# Patient Record
Sex: FEMALE | Race: WHITE | HISPANIC OR LATINO | Employment: FULL TIME | URBAN - METROPOLITAN AREA
[De-identification: names, ages, dates, MRNs, and addresses within clinical notes are randomized per-mention and may not be internally consistent; named-entity substitution may affect disease eponyms.]

---

## 2017-11-06 ENCOUNTER — HOSPITAL ENCOUNTER (OUTPATIENT)
Dept: RADIOLOGY | Facility: MEDICAL CENTER | Age: 42
End: 2017-11-06
Attending: FAMILY MEDICINE
Payer: COMMERCIAL

## 2017-11-06 ENCOUNTER — APPOINTMENT (OUTPATIENT)
Dept: DERMATOLOGY | Facility: IMAGING CENTER | Age: 42
End: 2017-11-06

## 2017-11-06 DIAGNOSIS — Z12.39 SCREENING BREAST EXAMINATION: ICD-10-CM

## 2017-11-06 PROCEDURE — G0202 SCR MAMMO BI INCL CAD: HCPCS

## 2018-02-26 ENCOUNTER — HOSPITAL ENCOUNTER (OUTPATIENT)
Dept: RADIOLOGY | Facility: MEDICAL CENTER | Age: 43
End: 2018-02-26
Attending: FAMILY MEDICINE
Payer: COMMERCIAL

## 2018-02-26 DIAGNOSIS — D17.1 BREAST LIPOMA: ICD-10-CM

## 2018-02-26 PROCEDURE — 76642 ULTRASOUND BREAST LIMITED: CPT | Mod: LT

## 2018-04-27 ENCOUNTER — OFFICE VISIT (OUTPATIENT)
Dept: URGENT CARE | Facility: PHYSICIAN GROUP | Age: 43
End: 2018-04-27
Payer: COMMERCIAL

## 2018-04-27 VITALS
BODY MASS INDEX: 26.5 KG/M2 | HEIGHT: 60 IN | SYSTOLIC BLOOD PRESSURE: 128 MMHG | HEART RATE: 72 BPM | DIASTOLIC BLOOD PRESSURE: 80 MMHG | RESPIRATION RATE: 14 BRPM | TEMPERATURE: 98 F | WEIGHT: 135 LBS | OXYGEN SATURATION: 98 %

## 2018-04-27 DIAGNOSIS — M62.838 MUSCLE SPASM: ICD-10-CM

## 2018-04-27 PROCEDURE — 99204 OFFICE O/P NEW MOD 45 MIN: CPT | Performed by: FAMILY MEDICINE

## 2018-04-27 RX ORDER — SENNOSIDES 8.6 MG
650 CAPSULE ORAL EVERY 6 HOURS PRN
COMMUNITY
End: 2023-04-04

## 2018-04-27 RX ORDER — CYCLOBENZAPRINE HCL 10 MG
10 TABLET ORAL
Qty: 15 TAB | Refills: 0 | Status: SHIPPED | OUTPATIENT
Start: 2018-04-27 | End: 2022-07-07

## 2018-04-27 RX ORDER — MELOXICAM 15 MG/1
15 TABLET ORAL DAILY
Qty: 30 TAB | Refills: 0 | Status: SHIPPED | OUTPATIENT
Start: 2018-04-27 | End: 2022-07-07

## 2018-04-27 ASSESSMENT — ENCOUNTER SYMPTOMS
BOWEL INCONTINENCE: 0
DIZZINESS: 0
VOMITING: 0
LEG PAIN: 0
CHILLS: 0
WEAKNESS: 0
SORE THROAT: 0
EYE PAIN: 0
PERIANAL NUMBNESS: 0
SHORTNESS OF BREATH: 0
PARESIS: 0
MYALGIAS: 0
BACK PAIN: 1
NAUSEA: 0
NUMBNESS: 0
TINGLING: 0
FEVER: 0

## 2018-04-27 ASSESSMENT — PAIN SCALES - GENERAL: PAINLEVEL: 7=MODERATE-SEVERE PAIN

## 2018-04-27 NOTE — LETTER
April 27, 2018         Patient: Tracey Pruitt   YOB: 1975   Date of Visit: 4/27/2018           To Whom it May Concern:    Tracey Pruitt was seen in my clinic on 4/27/2018. She may return to work on 4/30/2018..    If you have any questions or concerns, please don't hesitate to call.        Sincerely,           Lars Puri M.D.  Electronically Signed

## 2019-07-25 ENCOUNTER — HOSPITAL ENCOUNTER (OUTPATIENT)
Dept: RADIOLOGY | Facility: MEDICAL CENTER | Age: 44
End: 2019-07-25
Attending: FAMILY MEDICINE
Payer: COMMERCIAL

## 2019-07-25 DIAGNOSIS — Z12.31 VISIT FOR SCREENING MAMMOGRAM: ICD-10-CM

## 2019-07-25 PROCEDURE — 77063 BREAST TOMOSYNTHESIS BI: CPT

## 2021-02-02 ENCOUNTER — HOSPITAL ENCOUNTER (OUTPATIENT)
Dept: LAB | Facility: MEDICAL CENTER | Age: 46
End: 2021-02-02
Payer: COMMERCIAL

## 2021-02-02 LAB
COVID ORDER STATUS COVID19: NORMAL
SARS-COV-2 RNA RESP QL NAA+PROBE: NOTDETECTED
SPECIMEN SOURCE: NORMAL

## 2022-06-14 ENCOUNTER — TELEPHONE (OUTPATIENT)
Dept: SCHEDULING | Facility: IMAGING CENTER | Age: 47
End: 2022-06-14
Payer: COMMERCIAL

## 2022-06-30 ENCOUNTER — HOSPITAL ENCOUNTER (OUTPATIENT)
Dept: RADIOLOGY | Facility: MEDICAL CENTER | Age: 47
End: 2022-06-30
Payer: COMMERCIAL

## 2022-07-06 ENCOUNTER — HOSPITAL ENCOUNTER (OUTPATIENT)
Dept: RADIOLOGY | Facility: MEDICAL CENTER | Age: 47
End: 2022-07-06
Attending: SURGERY
Payer: COMMERCIAL

## 2022-07-06 DIAGNOSIS — R92.8 ABNORMAL FINDINGS ON DIAGNOSTIC IMAGING OF BREAST: ICD-10-CM

## 2022-07-06 PROCEDURE — 19283 PERQ DEV BREAST 1ST STRTCTC: CPT

## 2022-07-07 ENCOUNTER — PRE-ADMISSION TESTING (OUTPATIENT)
Dept: ADMISSIONS | Facility: MEDICAL CENTER | Age: 47
End: 2022-07-07
Attending: SURGERY
Payer: COMMERCIAL

## 2022-07-07 DIAGNOSIS — Z01.812 PRE-OPERATIVE LABORATORY EXAMINATION: ICD-10-CM

## 2022-07-07 LAB
ALBUMIN SERPL BCP-MCNC: 4.4 G/DL (ref 3.2–4.9)
ALBUMIN/GLOB SERPL: 1.4 G/DL
ALP SERPL-CCNC: 162 U/L (ref 30–99)
ALT SERPL-CCNC: 12 U/L (ref 2–50)
ANION GAP SERPL CALC-SCNC: 13 MMOL/L (ref 7–16)
AST SERPL-CCNC: 11 U/L (ref 12–45)
BASOPHILS # BLD AUTO: 0.3 % (ref 0–1.8)
BASOPHILS # BLD: 0.02 K/UL (ref 0–0.12)
BILIRUB SERPL-MCNC: 0.2 MG/DL (ref 0.1–1.5)
BUN SERPL-MCNC: 12 MG/DL (ref 8–22)
CALCIUM SERPL-MCNC: 9.6 MG/DL (ref 8.5–10.5)
CHLORIDE SERPL-SCNC: 105 MMOL/L (ref 96–112)
CO2 SERPL-SCNC: 24 MMOL/L (ref 20–33)
CREAT SERPL-MCNC: 0.75 MG/DL (ref 0.5–1.4)
EOSINOPHIL # BLD AUTO: 0.13 K/UL (ref 0–0.51)
EOSINOPHIL NFR BLD: 2.1 % (ref 0–6.9)
ERYTHROCYTE [DISTWIDTH] IN BLOOD BY AUTOMATED COUNT: 40.6 FL (ref 35.9–50)
GFR SERPLBLD CREATININE-BSD FMLA CKD-EPI: 97 ML/MIN/1.73 M 2
GLOBULIN SER CALC-MCNC: 3.1 G/DL (ref 1.9–3.5)
GLUCOSE SERPL-MCNC: 85 MG/DL (ref 65–99)
HCT VFR BLD AUTO: 43.3 % (ref 37–47)
HGB BLD-MCNC: 14.6 G/DL (ref 12–16)
IMM GRANULOCYTES # BLD AUTO: 0.02 K/UL (ref 0–0.11)
IMM GRANULOCYTES NFR BLD AUTO: 0.3 % (ref 0–0.9)
INR PPP: 0.99 (ref 0.87–1.13)
LYMPHOCYTES # BLD AUTO: 2.2 K/UL (ref 1–4.8)
LYMPHOCYTES NFR BLD: 35.4 % (ref 22–41)
MCH RBC QN AUTO: 30.6 PG (ref 27–33)
MCHC RBC AUTO-ENTMCNC: 33.7 G/DL (ref 33.6–35)
MCV RBC AUTO: 90.8 FL (ref 81.4–97.8)
MONOCYTES # BLD AUTO: 0.39 K/UL (ref 0–0.85)
MONOCYTES NFR BLD AUTO: 6.3 % (ref 0–13.4)
NEUTROPHILS # BLD AUTO: 3.45 K/UL (ref 2–7.15)
NEUTROPHILS NFR BLD: 55.6 % (ref 44–72)
NRBC # BLD AUTO: 0 K/UL
NRBC BLD-RTO: 0 /100 WBC
PLATELET # BLD AUTO: 321 K/UL (ref 164–446)
PMV BLD AUTO: 10.6 FL (ref 9–12.9)
POTASSIUM SERPL-SCNC: 3.7 MMOL/L (ref 3.6–5.5)
PROT SERPL-MCNC: 7.5 G/DL (ref 6–8.2)
PROTHROMBIN TIME: 13 SEC (ref 12–14.6)
RBC # BLD AUTO: 4.77 M/UL (ref 4.2–5.4)
SODIUM SERPL-SCNC: 142 MMOL/L (ref 135–145)
WBC # BLD AUTO: 6.2 K/UL (ref 4.8–10.8)

## 2022-07-07 PROCEDURE — 80053 COMPREHEN METABOLIC PANEL: CPT

## 2022-07-07 PROCEDURE — 85610 PROTHROMBIN TIME: CPT

## 2022-07-07 PROCEDURE — 36415 COLL VENOUS BLD VENIPUNCTURE: CPT

## 2022-07-07 PROCEDURE — 85025 COMPLETE CBC W/AUTO DIFF WBC: CPT

## 2022-07-07 RX ORDER — M-VIT,TX,IRON,MINS/CALC/FOLIC 27MG-0.4MG
1 TABLET ORAL DAILY
COMMUNITY
End: 2023-04-04

## 2022-07-14 ENCOUNTER — ANESTHESIA (OUTPATIENT)
Dept: SURGERY | Facility: MEDICAL CENTER | Age: 47
End: 2022-07-14
Payer: COMMERCIAL

## 2022-07-14 ENCOUNTER — APPOINTMENT (OUTPATIENT)
Dept: RADIOLOGY | Facility: MEDICAL CENTER | Age: 47
End: 2022-07-14
Attending: SURGERY
Payer: COMMERCIAL

## 2022-07-14 ENCOUNTER — HOSPITAL ENCOUNTER (OUTPATIENT)
Facility: MEDICAL CENTER | Age: 47
End: 2022-07-14
Attending: SURGERY | Admitting: SURGERY
Payer: COMMERCIAL

## 2022-07-14 ENCOUNTER — ANESTHESIA EVENT (OUTPATIENT)
Dept: SURGERY | Facility: MEDICAL CENTER | Age: 47
End: 2022-07-14
Payer: COMMERCIAL

## 2022-07-14 VITALS
DIASTOLIC BLOOD PRESSURE: 68 MMHG | BODY MASS INDEX: 28.57 KG/M2 | HEART RATE: 65 BPM | RESPIRATION RATE: 15 BRPM | HEIGHT: 60 IN | OXYGEN SATURATION: 96 % | WEIGHT: 145.5 LBS | SYSTOLIC BLOOD PRESSURE: 134 MMHG | TEMPERATURE: 98.5 F

## 2022-07-14 DIAGNOSIS — R92.8 ABNORMAL MAMMOGRAM: ICD-10-CM

## 2022-07-14 DIAGNOSIS — G89.18 POSTOPERATIVE PAIN: ICD-10-CM

## 2022-07-14 DIAGNOSIS — R92.8 ABNORMAL FINDINGS ON DIAGNOSTIC IMAGING OF BREAST: ICD-10-CM

## 2022-07-14 PROBLEM — N60.91 ATYPICAL LOBULAR HYPERPLASIA (ALH) OF RIGHT BREAST: Status: ACTIVE | Noted: 2022-07-14

## 2022-07-14 LAB
HCG UR QL: NEGATIVE
PATHOLOGY CONSULT NOTE: NORMAL

## 2022-07-14 PROCEDURE — 19281 PERQ DEVICE BREAST 1ST IMAG: CPT | Mod: RT

## 2022-07-14 PROCEDURE — 160039 HCHG SURGERY MINUTES - EA ADDL 1 MIN LEVEL 3: Performed by: SURGERY

## 2022-07-14 PROCEDURE — 76098 X-RAY EXAM SURGICAL SPECIMEN: CPT | Mod: RT

## 2022-07-14 PROCEDURE — 160048 HCHG OR STATISTICAL LEVEL 1-5: Performed by: SURGERY

## 2022-07-14 PROCEDURE — 88307 TISSUE EXAM BY PATHOLOGIST: CPT

## 2022-07-14 PROCEDURE — 160002 HCHG RECOVERY MINUTES (STAT): Performed by: SURGERY

## 2022-07-14 PROCEDURE — 81025 URINE PREGNANCY TEST: CPT

## 2022-07-14 PROCEDURE — 700111 HCHG RX REV CODE 636 W/ 250 OVERRIDE (IP): Performed by: ANESTHESIOLOGY

## 2022-07-14 PROCEDURE — 160047 HCHG PACU  - EA ADDL 30 MINS PHASE II: Performed by: SURGERY

## 2022-07-14 PROCEDURE — 160009 HCHG ANES TIME/MIN: Performed by: SURGERY

## 2022-07-14 PROCEDURE — 700101 HCHG RX REV CODE 250: Performed by: ANESTHESIOLOGY

## 2022-07-14 PROCEDURE — 160025 RECOVERY II MINUTES (STATS): Performed by: SURGERY

## 2022-07-14 PROCEDURE — 160035 HCHG PACU - 1ST 60 MINS PHASE I: Performed by: SURGERY

## 2022-07-14 PROCEDURE — 160028 HCHG SURGERY MINUTES - 1ST 30 MINS LEVEL 3: Performed by: SURGERY

## 2022-07-14 PROCEDURE — 00400 ANES INTEGUMENTARY SYS NOS: CPT | Performed by: ANESTHESIOLOGY

## 2022-07-14 PROCEDURE — 700105 HCHG RX REV CODE 258: Performed by: SURGERY

## 2022-07-14 PROCEDURE — 160046 HCHG PACU - 1ST 60 MINS PHASE II: Performed by: SURGERY

## 2022-07-14 RX ORDER — HYDROMORPHONE HYDROCHLORIDE 1 MG/ML
0.2 INJECTION, SOLUTION INTRAMUSCULAR; INTRAVENOUS; SUBCUTANEOUS
Status: DISCONTINUED | OUTPATIENT
Start: 2022-07-14 | End: 2022-07-14 | Stop reason: HOSPADM

## 2022-07-14 RX ORDER — SODIUM CHLORIDE, SODIUM LACTATE, POTASSIUM CHLORIDE, CALCIUM CHLORIDE 600; 310; 30; 20 MG/100ML; MG/100ML; MG/100ML; MG/100ML
INJECTION, SOLUTION INTRAVENOUS CONTINUOUS
Status: DISCONTINUED | OUTPATIENT
Start: 2022-07-14 | End: 2022-07-14 | Stop reason: HOSPADM

## 2022-07-14 RX ORDER — HYDRALAZINE HYDROCHLORIDE 20 MG/ML
5 INJECTION INTRAMUSCULAR; INTRAVENOUS
Status: DISCONTINUED | OUTPATIENT
Start: 2022-07-14 | End: 2022-07-14 | Stop reason: HOSPADM

## 2022-07-14 RX ORDER — HALOPERIDOL 5 MG/ML
1 INJECTION INTRAMUSCULAR
Status: DISCONTINUED | OUTPATIENT
Start: 2022-07-14 | End: 2022-07-14 | Stop reason: HOSPADM

## 2022-07-14 RX ORDER — LABETALOL HYDROCHLORIDE 5 MG/ML
5 INJECTION, SOLUTION INTRAVENOUS
Status: DISCONTINUED | OUTPATIENT
Start: 2022-07-14 | End: 2022-07-14 | Stop reason: HOSPADM

## 2022-07-14 RX ORDER — DEXAMETHASONE SODIUM PHOSPHATE 4 MG/ML
INJECTION, SOLUTION INTRA-ARTICULAR; INTRALESIONAL; INTRAMUSCULAR; INTRAVENOUS; SOFT TISSUE PRN
Status: DISCONTINUED | OUTPATIENT
Start: 2022-07-14 | End: 2022-07-14 | Stop reason: SURG

## 2022-07-14 RX ORDER — MIDAZOLAM HYDROCHLORIDE 1 MG/ML
1 INJECTION INTRAMUSCULAR; INTRAVENOUS
Status: DISCONTINUED | OUTPATIENT
Start: 2022-07-14 | End: 2022-07-14 | Stop reason: HOSPADM

## 2022-07-14 RX ORDER — ONDANSETRON 2 MG/ML
4 INJECTION INTRAMUSCULAR; INTRAVENOUS
Status: DISCONTINUED | OUTPATIENT
Start: 2022-07-14 | End: 2022-07-14 | Stop reason: HOSPADM

## 2022-07-14 RX ORDER — PHENYLEPHRINE HYDROCHLORIDE 10 MG/ML
INJECTION, SOLUTION INTRAMUSCULAR; INTRAVENOUS; SUBCUTANEOUS PRN
Status: DISCONTINUED | OUTPATIENT
Start: 2022-07-14 | End: 2022-07-14 | Stop reason: SURG

## 2022-07-14 RX ORDER — BUPIVACAINE HYDROCHLORIDE AND EPINEPHRINE 5; 5 MG/ML; UG/ML
INJECTION, SOLUTION EPIDURAL; INTRACAUDAL; PERINEURAL
Status: DISCONTINUED
Start: 2022-07-14 | End: 2022-07-14 | Stop reason: HOSPADM

## 2022-07-14 RX ORDER — OXYCODONE HCL 5 MG/5 ML
10 SOLUTION, ORAL ORAL
Status: DISCONTINUED | OUTPATIENT
Start: 2022-07-14 | End: 2022-07-14 | Stop reason: HOSPADM

## 2022-07-14 RX ORDER — LIDOCAINE HYDROCHLORIDE 20 MG/ML
INJECTION, SOLUTION EPIDURAL; INFILTRATION; INTRACAUDAL; PERINEURAL
Status: COMPLETED
Start: 2022-07-14 | End: 2022-07-14

## 2022-07-14 RX ORDER — MEPERIDINE HYDROCHLORIDE 25 MG/ML
12.5 INJECTION INTRAMUSCULAR; INTRAVENOUS; SUBCUTANEOUS
Status: DISCONTINUED | OUTPATIENT
Start: 2022-07-14 | End: 2022-07-14 | Stop reason: HOSPADM

## 2022-07-14 RX ORDER — HYDROMORPHONE HYDROCHLORIDE 1 MG/ML
0.1 INJECTION, SOLUTION INTRAMUSCULAR; INTRAVENOUS; SUBCUTANEOUS
Status: DISCONTINUED | OUTPATIENT
Start: 2022-07-14 | End: 2022-07-14 | Stop reason: HOSPADM

## 2022-07-14 RX ORDER — TRAMADOL HYDROCHLORIDE 50 MG/1
50 TABLET ORAL EVERY 4 HOURS PRN
Qty: 12 TABLET | Refills: 0 | Status: SHIPPED | OUTPATIENT
Start: 2022-07-14 | End: 2022-07-17

## 2022-07-14 RX ORDER — ONDANSETRON 2 MG/ML
INJECTION INTRAMUSCULAR; INTRAVENOUS PRN
Status: DISCONTINUED | OUTPATIENT
Start: 2022-07-14 | End: 2022-07-14 | Stop reason: SURG

## 2022-07-14 RX ORDER — HYDROMORPHONE HYDROCHLORIDE 1 MG/ML
0.4 INJECTION, SOLUTION INTRAMUSCULAR; INTRAVENOUS; SUBCUTANEOUS
Status: DISCONTINUED | OUTPATIENT
Start: 2022-07-14 | End: 2022-07-14 | Stop reason: HOSPADM

## 2022-07-14 RX ORDER — METOPROLOL TARTRATE 1 MG/ML
1 INJECTION, SOLUTION INTRAVENOUS
Status: DISCONTINUED | OUTPATIENT
Start: 2022-07-14 | End: 2022-07-14 | Stop reason: HOSPADM

## 2022-07-14 RX ORDER — ALBUTEROL SULFATE 2.5 MG/3ML
2.5 SOLUTION RESPIRATORY (INHALATION)
Status: DISCONTINUED | OUTPATIENT
Start: 2022-07-14 | End: 2022-07-14 | Stop reason: HOSPADM

## 2022-07-14 RX ORDER — CEFAZOLIN SODIUM 1 G/3ML
INJECTION, POWDER, FOR SOLUTION INTRAMUSCULAR; INTRAVENOUS PRN
Status: DISCONTINUED | OUTPATIENT
Start: 2022-07-14 | End: 2022-07-14 | Stop reason: SURG

## 2022-07-14 RX ORDER — LIDOCAINE HYDROCHLORIDE 20 MG/ML
INJECTION, SOLUTION EPIDURAL; INFILTRATION; INTRACAUDAL; PERINEURAL PRN
Status: DISCONTINUED | OUTPATIENT
Start: 2022-07-14 | End: 2022-07-14 | Stop reason: HOSPADM

## 2022-07-14 RX ORDER — OXYCODONE HCL 5 MG/5 ML
5 SOLUTION, ORAL ORAL
Status: DISCONTINUED | OUTPATIENT
Start: 2022-07-14 | End: 2022-07-14 | Stop reason: HOSPADM

## 2022-07-14 RX ORDER — DIPHENHYDRAMINE HYDROCHLORIDE 50 MG/ML
12.5 INJECTION INTRAMUSCULAR; INTRAVENOUS
Status: DISCONTINUED | OUTPATIENT
Start: 2022-07-14 | End: 2022-07-14 | Stop reason: HOSPADM

## 2022-07-14 RX ADMIN — MIDAZOLAM 2 MG: 1 INJECTION INTRAMUSCULAR; INTRAVENOUS at 12:15

## 2022-07-14 RX ADMIN — FENTANYL CITRATE 100 MCG: 50 INJECTION, SOLUTION INTRAMUSCULAR; INTRAVENOUS at 12:15

## 2022-07-14 RX ADMIN — DEXAMETHASONE SODIUM PHOSPHATE 8 MG: 4 INJECTION, SOLUTION INTRA-ARTICULAR; INTRALESIONAL; INTRAMUSCULAR; INTRAVENOUS; SOFT TISSUE at 12:15

## 2022-07-14 RX ADMIN — SODIUM CHLORIDE, POTASSIUM CHLORIDE, SODIUM LACTATE AND CALCIUM CHLORIDE: 600; 310; 30; 20 INJECTION, SOLUTION INTRAVENOUS at 06:55

## 2022-07-14 RX ADMIN — ONDANSETRON 4 MG: 2 INJECTION INTRAMUSCULAR; INTRAVENOUS at 12:15

## 2022-07-14 RX ADMIN — CEFAZOLIN 2 G: 330 INJECTION, POWDER, FOR SOLUTION INTRAMUSCULAR; INTRAVENOUS at 12:15

## 2022-07-14 RX ADMIN — LIDOCAINE HYDROCHLORIDE 100 MG: 20 INJECTION, SOLUTION EPIDURAL; INFILTRATION; INTRACAUDAL at 12:15

## 2022-07-14 RX ADMIN — PROPOFOL 150 MG: 10 INJECTION, EMULSION INTRAVENOUS at 12:15

## 2022-07-14 RX ADMIN — PHENYLEPHRINE HYDROCHLORIDE 100 MCG: 10 INJECTION INTRAVENOUS at 12:24

## 2022-07-14 ASSESSMENT — PAIN SCALES - GENERAL: PAIN_LEVEL: 0

## 2022-07-14 ASSESSMENT — PAIN DESCRIPTION - PAIN TYPE
TYPE: SURGICAL PAIN

## 2022-07-14 ASSESSMENT — FIBROSIS 4 INDEX: FIB4 SCORE: 0.48

## 2022-07-14 NOTE — ANESTHESIA PROCEDURE NOTES
Airway    Date/Time: 7/14/2022 12:16 PM  Performed by: Cecil Hogan M.D.  Authorized by: Cecil Hogan M.D.     Location:  OR  Urgency:  Elective  Indications for Airway Management:  Anesthesia      Spontaneous Ventilation: absent    Sedation Level:  Deep  Preoxygenated: Yes    Final Airway Type:  Supraglottic airway  Final Supraglottic Airway:  Standard LMA    SGA Size:  3  Number of Attempts at Approach:  1

## 2022-07-14 NOTE — PROGRESS NOTES
1309- Patient arrived in PACU and was connected to monitors. Vital signs are stable, patient is on 10L O2 via mask. Report received from anesthesiologist and OR RN. Oral airway in place.    1310- Oral airway dc'd.     1320- Patient is starting to wake up and reports no pain.     1345- Report given to MARIBELL Diaz.

## 2022-07-14 NOTE — OR NURSING
1345 - Pt now on room air, sleeping, VSS, no signs pain or nausea, dressings to chest are clean/dry/intact and no signs of swelling or bleeding noted.     1436 - Discharge instructions gone over with patient and daughter.  Pt having juice, got dressed with minimal assistance.     1445 - Pt stable to discharge.  Instructions given, patient and daughter verbalize understanding.  IV And armbands removed.  Taken via wheelchair to car.  Pt has all belongings with them.

## 2022-07-14 NOTE — ANESTHESIA POSTPROCEDURE EVALUATION
Patient: Tracey Pruitt    Procedure Summary     Date: 07/14/22 Room / Location: [facility] ROOM 24 / SURGERY SAME DAY [facility]    Anesthesia Start: 1211 Anesthesia Stop: 1314    Procedure: WIRE DIRECTED RIGHT BIOPSY, BREAST (Breast) Diagnosis: (ABNORMAL FINDINGS ON DIAGNOSTIC IMAGING OF BREAST)    Surgeons: Alfredo Mcelroy M.D. Responsible Provider: Cecil Hogan M.D.    Anesthesia Type: general ASA Status: 2          Final Anesthesia Type: general  Last vitals  BP   Blood Pressure: 110/72    Temp   36.9 °C (98.5 °F)    Pulse   (!) 109   Resp   15    SpO2   98 %      Anesthesia Post Evaluation    Pain score: 0                No complications documented.     Nurse Pain Score: 0 (NPRS)

## 2022-07-14 NOTE — ANESTHESIA TIME REPORT
Anesthesia Start and Stop Event Times     Date Time Event    7/14/2022 1211 Anesthesia Start     1314 Anesthesia Stop        Responsible Staff  07/14/22    Name Role Begin End    Cecil Hogan M.D. Anesth 1211 1314        Overtime Reason:  no overtime (within assigned shift)    Comments:

## 2022-07-15 NOTE — OP REPORT
DATE OF SERVICE:  07/14/2022     SURGEON:  Alfredo Mcelroy MD     ASSISTANT:  Susan Marcos ASC     ANESTHESIOLOGIST:  Cecil Hogan MD     TYPE OF ANESTHETIC:  General anesthetic using laryngeal mask airway plus local   0.5% Marcaine with epinephrine.     PREOPERATIVE DIAGNOSIS:  Clustered calcifications in the right upper mid   breast region with a core biopsy revealing multiple foci of atypical lobular   hyperplasia.     POSTOPERATIVE DIAGNOSIS:  Clustered calcifications in the right upper mid   breast region with a core biopsy revealing multiple foci of atypical lobular   hyperplasia.     PROCEDURE:  Wire localized and RAMBO-directed right breast lumpectomy.     FINDINGS:  The patient is a 47-year-old female who had a mammogram back in   05/2021, which revealed a small cluster of microcalcifications in the right   mid breast.  A core biopsy was performed and revealed adenosis and well as   several small foci of atypical lobular hyperplasia.  She had another mammogram   in 06/2022 and the clip from the previous biopsy was noted along with   additional indeterminate calcifications in this area.  It was felt by   radiology that these needed to be removed as they were somewhat suspicious.    Therefore, a RAMBO-directed lumpectomy was recommended.  The patient had a RAMBO   placed preoperatively; however, the clip could not be identified by the   detector in radiology and therefore on the day of surgery, the patient also   had a wire placed in the area of the previous biopsy into the RAMBO clip.  A   lumpectomy around this area was performed and the specimen radiograph revealed   both the previous biopsy clip and the RAMBO clip to be in the center of the   specimen along with an area of clustered microcalcifications.  There were no   apparent complications.     FINDING AND PROCEDURE:  The patient was brought to the operating room and   placed on the table in supine position.  General anesthetic was then provided    by the anesthesiologist, Dr. Hogan.  The right breast was then prepped and   draped in the usual sterile fashion, being careful not to dislodge the wire   that had been placed by radiology.  This wire did entry at the 12 o'clock   position of the right breast.  A timeout was called.  The correct patient,   correct diagnosis, correct surgery, and correct location of the surgery were   discussed and agreed upon.  She received preoperative IV antibiotics.  A   curvilinear incision was then made long term between where the wire into the   superior breast and the areolar edge.  This incision was made with #15 blade   and all bleeding was controlled with electrocautery.  Dissection was then   carried down deeper into the breast tissue.  The wire was then found to be   coursing to the breast tissue and was brought into the operative field.  At   this point, the RAMBO detector could locate the reflector clip and it also was   used to help guide the dissection.  Lumpectomy was performed around the wire   trying to obtain a 2-3 cm margin around this area.  This was carried down to   the pectoralis major muscle and then the entire specimen with the wire in the   center of it was removed en bloc.  A specimen radiograph was obtained, which   revealed the clip from the first biopsy, the RAMBO reflector as well as a   suspicious clustered calcifications next to the clips were then identified.    These were then sent to pathology for further characterization.  The wound was   irrigated with approximately 100 mL of warm normal saline.  The deeper breast   tissue was very dense, but was brought back together with 3-0 Vicryl sutures.    Dermis of the incision was closed using running 3-0 Vicryl suture and the   skin was closed using running 4-0 Monocryl suture in subcuticular fashion.    Steri-Strips and sterile dressings were applied and Ace wrap was applied.  The   patient tolerated the procedure well with no complication.  Final  sponge and   needle count were correct.     An assistant was utilized for the lumpectomy and was necessary for completion   of the operation.  The assistant assisted with the retraction of the breast   flaps, so that the lumpectomy could be performed.  The assistant also assisted   with the multilayer closure.        ______________________________  MD JUSTIN DYER/GOMEZ/GLADYS    DD:  07/14/2022 20:45  DT:  07/14/2022 22:09    Job#:  [id number]    CC:DO PRASHANTH Leavitt, COA  Bonnie Swartz DO

## 2022-09-14 ENCOUNTER — APPOINTMENT (OUTPATIENT)
Dept: HEMATOLOGY ONCOLOGY | Facility: MEDICAL CENTER | Age: 47
End: 2022-09-14
Payer: COMMERCIAL

## 2022-10-31 ENCOUNTER — APPOINTMENT (OUTPATIENT)
Dept: HEMATOLOGY ONCOLOGY | Facility: MEDICAL CENTER | Age: 47
End: 2022-10-31
Payer: COMMERCIAL

## 2022-11-14 ENCOUNTER — HOSPITAL ENCOUNTER (OUTPATIENT)
Dept: HEMATOLOGY ONCOLOGY | Facility: MEDICAL CENTER | Age: 47
End: 2022-11-14
Attending: INTERNAL MEDICINE
Payer: COMMERCIAL

## 2022-11-14 VITALS
HEART RATE: 65 BPM | DIASTOLIC BLOOD PRESSURE: 72 MMHG | RESPIRATION RATE: 18 BRPM | OXYGEN SATURATION: 97 % | WEIGHT: 146.83 LBS | TEMPERATURE: 97.4 F | SYSTOLIC BLOOD PRESSURE: 125 MMHG | HEIGHT: 60 IN | BODY MASS INDEX: 28.83 KG/M2

## 2022-11-14 DIAGNOSIS — N60.91 ATYPICAL LOBULAR HYPERPLASIA (ALH) OF RIGHT BREAST: ICD-10-CM

## 2022-11-14 PROCEDURE — 99204 OFFICE O/P NEW MOD 45 MIN: CPT | Performed by: INTERNAL MEDICINE

## 2022-11-14 PROCEDURE — 99212 OFFICE O/P EST SF 10 MIN: CPT | Performed by: INTERNAL MEDICINE

## 2022-11-14 ASSESSMENT — PAIN SCALES - GENERAL: PAINLEVEL: NO PAIN

## 2022-11-14 ASSESSMENT — ENCOUNTER SYMPTOMS
GASTROINTESTINAL NEGATIVE: 1
NEUROLOGICAL NEGATIVE: 1
CONSTITUTIONAL NEGATIVE: 1
PSYCHIATRIC NEGATIVE: 1
RESPIRATORY NEGATIVE: 1
MUSCULOSKELETAL NEGATIVE: 1
CARDIOVASCULAR NEGATIVE: 1
EYES NEGATIVE: 1

## 2022-11-14 ASSESSMENT — FIBROSIS 4 INDEX: FIB4 SCORE: 0.48

## 2022-11-14 NOTE — LETTER
[facility]   [address] [place] [address],   [address]  [place] [address]  Phone: [phone]  Fax: [phone]              Tracey Pruitt  1975    Encounter Date: 2022    Hoang Sow M.D.          PROGRESS NOTE:  Consult:  Hematology/Oncology      Referring Physician: Alfredo Mcelroy MD  Primary Care:  Pcp Unknown    Diagnosis: LCIS of the right breast    Chief Complaint: LCIS of the right breast    History of Presenting Illness:  Tracey Pruitt is a 47 y.o. perimenopausal  female who had an abnormal mammogram at an outside facility in .  She saw Dr. Mcelroy who did a upper outer quadrant right breast lumpectomy on 2022 which demonstrated lobular carcinoma in situ, usual type with atypical lobular hyperplasia.  Microcalcifications were appreciated.  There was no DCIS.  She was placed on observation and has another mammogram scheduled for January.  She comes in today to discuss chemoprophylaxis.  She is generally healthy and gets her period every several months.  She has occasional hot flashes and night sweats.  She does note a subcutaneous lump in her right axilla that opens and expresses yellow discharge at times.  It is somewhat painful when it gets swollen.  This is happened a couple of times already in the past few months.  She is otherwise healthy and working full-time.      Past Medical History:   Diagnosis Date   • Atypical lobular hyperplasia (ALH) of right breast 2022   • Pain     foot pain       Past Surgical History:   Procedure Laterality Date   • BREAST BIOPSY  2022    Procedure: WIRE DIRECTED RIGHT BIOPSY, BREAST;  Surgeon: Alfredo Mcelroy M.D.;  Location: SURGERY SAME DAY [facility];  Service: General   • OTHER ABDOMINAL SURGERY             Social History     Tobacco Use   • Smoking status: Former     Packs/day: 0.50     Years: 20.00     Pack years: 10.00     Types: Cigarettes     Quit date:      Years since quittin.8   • Smokeless tobacco:  Never   • Tobacco comments:     4 cig. per day   Vaping Use   • Vaping Use: Never used   Substance Use Topics   • Alcohol use: No   • Drug use: No        Family History   Problem Relation Age of Onset   • Stroke Neg Hx    • Heart Disease Neg Hx        Allergies as of 11/14/2022   • (No Known Allergies)         Current Outpatient Medications:   •  therapeutic multivitamin-minerals (THERAGRAN-M) Tab, Take 1 Tablet by mouth every day., Disp: , Rfl:   •  acetaminophen (TYLENOL) 650 MG CR tablet, Take 1 Tablet by mouth every 6 hours as needed., Disp: , Rfl:     Review of Systems:  Review of Systems   Constitutional: Negative.    HENT: Negative.     Eyes: Negative.    Respiratory: Negative.     Cardiovascular: Negative.    Gastrointestinal: Negative.    Genitourinary: Negative.    Musculoskeletal: Negative.    Skin: Negative.    Neurological: Negative.    Endo/Heme/Allergies: Negative.    Psychiatric/Behavioral: Negative.          Physical Exam:  Vitals:    11/14/22 0958   BP: 125/72   Pulse: 65   Resp: 18   Temp: 36.3 °C (97.4 °F)   TempSrc: Temporal   SpO2: 97%   Weight: 66.6 kg (146 lb 13.2 oz)   Height: 1.524 m (5')       DESC; KARNOFSKY SCALE WITH ECOG EQUIVALENT: 100, Fully active, able to carry on all pre-disease performed without restriction (ECOG equivalent 0)    DISTRESS LEVEL: no apparent distress    Physical Exam  Constitutional:       Appearance: Normal appearance.   HENT:      Head: Normocephalic.      Mouth/Throat:      Mouth: Mucous membranes are moist.      Pharynx: Oropharynx is clear.   Eyes:      Extraocular Movements: Extraocular movements intact.      Conjunctiva/sclera: Conjunctivae normal.      Pupils: Pupils are equal, round, and reactive to light.   Cardiovascular:      Rate and Rhythm: Normal rate and regular rhythm.      Pulses: Normal pulses.   Pulmonary:      Effort: Pulmonary effort is normal.      Breath sounds: Normal breath sounds.   Chest:          Comments: Right breast shows  well-healed lumpectomy incision in the upper outer quadrant mass nipple discharge or tenderness.  There is a 1 x 1 cm subcutaneous nodule in the right axilla which is consistent with a sebaceous cyst.  No right axillary or supraclavicular adenopathy is noted.  Left breast is normal.  Abdominal:      General: Abdomen is flat. Bowel sounds are normal.      Palpations: Abdomen is soft. There is no mass.   Musculoskeletal:         General: Normal range of motion.   Skin:     General: Skin is warm.   Neurological:      General: No focal deficit present.      Mental Status: She is alert and oriented to person, place, and time.   Psychiatric:         Mood and Affect: Mood normal.         Behavior: Behavior normal.            Labs:  No visits with results within 1 Month(s) from this visit.   Latest known visit with results is:   Admission on 07/14/2022, Discharged on 07/14/2022   Component Date Value Ref Range Status   • Beta-Hcg Urine 07/14/2022 Negative  Negative Final   • Pathology Request 07/14/2022 Sent to Histo   Final       Imaging:           Assessment & Plan:    1.  LCIS of the right breast with ALH status post lumpectomy.  2.  Sebaceous cyst of the right axilla    Plan: I spoke with her about the risks and benefits of chemoprophylaxis with an aromatase inhibitor or tamoxifen.  She had previously been told that the medicine causes arthritis and is very reluctant to undergo any therapy now.  We talked about tamoxifen and its major side effect of hot flashes rather than musculoskeletal symptoms and she is still declining at this time.  We talked that if she had another episode of LCIS she would be at higher risk of recurrence and she was agreeable to come back and see us at that time if such a thing should occur.  In the meantime she will follow-up with Dr. Mcelroy and have every 6-month mammograms.  We also recommended warm soaks for the sebaceous cyst and if it recurs to see Dr. Mcelroy for excision.      Any  questions and concerns raised by the patient were answered to the best of my ability. Thank you for allowing me to participate in the care for this patient. Please feel free to contact me for any questions or concerns.     Hoang Sow M.D.              No Recipients

## 2022-11-14 NOTE — PROGRESS NOTES
Consult:  Hematology/Oncology      Referring Physician: Alfredo Mcelroy MD  Primary Care:  Pcp Unknown    Diagnosis: LCIS of the right breast    Chief Complaint: LCIS of the right breast    History of Presenting Illness:  Tracey Pruitt is a 47 y.o. perimenopausal  female who had an abnormal mammogram at an outside facility in .  She saw Dr. Mcelroy who did a upper outer quadrant right breast lumpectomy on 2022 which demonstrated lobular carcinoma in situ, usual type with atypical lobular hyperplasia.  Microcalcifications were appreciated.  There was no DCIS.  She was placed on observation and has another mammogram scheduled for January.  She comes in today to discuss chemoprophylaxis.  She is generally healthy and gets her period every several months.  She has occasional hot flashes and night sweats.  She does note a subcutaneous lump in her right axilla that opens and expresses yellow discharge at times.  It is somewhat painful when it gets swollen.  This is happened a couple of times already in the past few months.  She is otherwise healthy and working full-time.      Past Medical History:   Diagnosis Date    Atypical lobular hyperplasia (ALH) of right breast 2022    Pain     foot pain       Past Surgical History:   Procedure Laterality Date    BREAST BIOPSY  2022    Procedure: WIRE DIRECTED RIGHT BIOPSY, BREAST;  Surgeon: Alfredo Mcelroy M.D.;  Location: SURGERY SAME DAY [facility];  Service: General    OTHER ABDOMINAL SURGERY             Social History     Tobacco Use    Smoking status: Former     Packs/day: 0.50     Years: 20.00     Pack years: 10.00     Types: Cigarettes     Quit date:      Years since quittin.8    Smokeless tobacco: Never    Tobacco comments:     4 cig. per day   Vaping Use    Vaping Use: Never used   Substance Use Topics    Alcohol use: No    Drug use: No        Family History   Problem Relation Age of Onset    Stroke Neg Hx     Heart Disease Neg Hx         Allergies as of 11/14/2022    (No Known Allergies)         Current Outpatient Medications:     therapeutic multivitamin-minerals (THERAGRAN-M) Tab, Take 1 Tablet by mouth every day., Disp: , Rfl:     acetaminophen (TYLENOL) 650 MG CR tablet, Take 1 Tablet by mouth every 6 hours as needed., Disp: , Rfl:     Review of Systems:  Review of Systems   Constitutional: Negative.    HENT: Negative.     Eyes: Negative.    Respiratory: Negative.     Cardiovascular: Negative.    Gastrointestinal: Negative.    Genitourinary: Negative.    Musculoskeletal: Negative.    Skin: Negative.    Neurological: Negative.    Endo/Heme/Allergies: Negative.    Psychiatric/Behavioral: Negative.          Physical Exam:  Vitals:    11/14/22 0958   BP: 125/72   Pulse: 65   Resp: 18   Temp: 36.3 °C (97.4 °F)   TempSrc: Temporal   SpO2: 97%   Weight: 66.6 kg (146 lb 13.2 oz)   Height: 1.524 m (5')       DESC; KARNOFSKY SCALE WITH ECOG EQUIVALENT: 100, Fully active, able to carry on all pre-disease performed without restriction (ECOG equivalent 0)    DISTRESS LEVEL: no apparent distress    Physical Exam  Constitutional:       Appearance: Normal appearance.   HENT:      Head: Normocephalic.      Mouth/Throat:      Mouth: Mucous membranes are moist.      Pharynx: Oropharynx is clear.   Eyes:      Extraocular Movements: Extraocular movements intact.      Conjunctiva/sclera: Conjunctivae normal.      Pupils: Pupils are equal, round, and reactive to light.   Cardiovascular:      Rate and Rhythm: Normal rate and regular rhythm.      Pulses: Normal pulses.   Pulmonary:      Effort: Pulmonary effort is normal.      Breath sounds: Normal breath sounds.   Chest:          Comments: Right breast shows well-healed lumpectomy incision in the upper outer quadrant mass nipple discharge or tenderness.  There is a 1 x 1 cm subcutaneous nodule in the right axilla which is consistent with a sebaceous cyst.  No right axillary or supraclavicular adenopathy is noted.   Left breast is normal.  Abdominal:      General: Abdomen is flat. Bowel sounds are normal.      Palpations: Abdomen is soft. There is no mass.   Musculoskeletal:         General: Normal range of motion.   Skin:     General: Skin is warm.   Neurological:      General: No focal deficit present.      Mental Status: She is alert and oriented to person, place, and time.   Psychiatric:         Mood and Affect: Mood normal.         Behavior: Behavior normal.            Labs:  No visits with results within 1 Month(s) from this visit.   Latest known visit with results is:   Admission on 07/14/2022, Discharged on 07/14/2022   Component Date Value Ref Range Status    Beta-Hcg Urine 07/14/2022 Negative  Negative Final    Pathology Request 07/14/2022 Sent to Histo   Final       Imaging:           Assessment & Plan:    1.  LCIS of the right breast with ALH status post lumpectomy.  2.  Sebaceous cyst of the right axilla    Plan: I spoke with her about the risks and benefits of chemoprophylaxis with an aromatase inhibitor or tamoxifen.  She had previously been told that the medicine causes arthritis and is very reluctant to undergo any therapy now.  We talked about tamoxifen and its major side effect of hot flashes rather than musculoskeletal symptoms and she is still declining at this time.  We talked that if she had another episode of LCIS she would be at higher risk of recurrence and she was agreeable to come back and see us at that time if such a thing should occur.  In the meantime she will follow-up with Dr. Mcelroy and have every 6-month mammograms.  We also recommended warm soaks for the sebaceous cyst and if it recurs to see Dr. Mcelroy for excision.      Any questions and concerns raised by the patient were answered to the best of my ability. Thank you for allowing me to participate in the care for this patient. Please feel free to contact me for any questions or concerns.     Hoang Sow M.D.

## 2022-11-14 NOTE — LETTER
[facility]   [address] [place] [address],   [address]  [place] [address]  Phone: [phone]  Fax: [phone]              Tracey Pruitt  1975    Encounter Date: 2022    Hoang Sow M.D.          PROGRESS NOTE:  Consult:  Hematology/Oncology      Referring Physician: Alfredo Mcelroy MD  Primary Care:  Pcp Unknown    Diagnosis: LCIS of the right breast    Chief Complaint: LCIS of the right breast    History of Presenting Illness:  Tracey Pruitt is a 47 y.o. perimenopausal  female who had an abnormal mammogram at an outside facility in .  She saw Dr. Mcelroy who did a upper outer quadrant right breast lumpectomy on 2022 which demonstrated lobular carcinoma in situ, usual type with atypical lobular hyperplasia.  Microcalcifications were appreciated.  There was no DCIS.  She was placed on observation and has another mammogram scheduled for January.  She comes in today to discuss chemoprophylaxis.  She is generally healthy and gets her period every several months.  She has occasional hot flashes and night sweats.  She does note a subcutaneous lump in her right axilla that opens and expresses yellow discharge at times.  It is somewhat painful when it gets swollen.  This is happened a couple of times already in the past few months.  She is otherwise healthy and working full-time.      Past Medical History:   Diagnosis Date   • Atypical lobular hyperplasia (ALH) of right breast 2022   • Pain     foot pain       Past Surgical History:   Procedure Laterality Date   • BREAST BIOPSY  2022    Procedure: WIRE DIRECTED RIGHT BIOPSY, BREAST;  Surgeon: Alfredo Mcelroy M.D.;  Location: SURGERY SAME DAY [facility];  Service: General   • OTHER ABDOMINAL SURGERY             Social History     Tobacco Use   • Smoking status: Former     Packs/day: 0.50     Years: 20.00     Pack years: 10.00     Types: Cigarettes     Quit date:      Years since quittin.8   • Smokeless tobacco:  Never   • Tobacco comments:     4 cig. per day   Vaping Use   • Vaping Use: Never used   Substance Use Topics   • Alcohol use: No   • Drug use: No        Family History   Problem Relation Age of Onset   • Stroke Neg Hx    • Heart Disease Neg Hx        Allergies as of 11/14/2022   • (No Known Allergies)         Current Outpatient Medications:   •  therapeutic multivitamin-minerals (THERAGRAN-M) Tab, Take 1 Tablet by mouth every day., Disp: , Rfl:   •  acetaminophen (TYLENOL) 650 MG CR tablet, Take 1 Tablet by mouth every 6 hours as needed., Disp: , Rfl:     Review of Systems:  Review of Systems   Constitutional: Negative.    HENT: Negative.     Eyes: Negative.    Respiratory: Negative.     Cardiovascular: Negative.    Gastrointestinal: Negative.    Genitourinary: Negative.    Musculoskeletal: Negative.    Skin: Negative.    Neurological: Negative.    Endo/Heme/Allergies: Negative.    Psychiatric/Behavioral: Negative.          Physical Exam:  Vitals:    11/14/22 0958   BP: 125/72   Pulse: 65   Resp: 18   Temp: 36.3 °C (97.4 °F)   TempSrc: Temporal   SpO2: 97%   Weight: 66.6 kg (146 lb 13.2 oz)   Height: 1.524 m (5')       DESC; KARNOFSKY SCALE WITH ECOG EQUIVALENT: 100, Fully active, able to carry on all pre-disease performed without restriction (ECOG equivalent 0)    DISTRESS LEVEL: no apparent distress    Physical Exam  Constitutional:       Appearance: Normal appearance.   HENT:      Head: Normocephalic.      Mouth/Throat:      Mouth: Mucous membranes are moist.      Pharynx: Oropharynx is clear.   Eyes:      Extraocular Movements: Extraocular movements intact.      Conjunctiva/sclera: Conjunctivae normal.      Pupils: Pupils are equal, round, and reactive to light.   Cardiovascular:      Rate and Rhythm: Normal rate and regular rhythm.      Pulses: Normal pulses.   Pulmonary:      Effort: Pulmonary effort is normal.      Breath sounds: Normal breath sounds.   Chest:          Comments: Right breast shows  well-healed lumpectomy incision in the upper outer quadrant mass nipple discharge or tenderness.  There is a 1 x 1 cm subcutaneous nodule in the right axilla which is consistent with a sebaceous cyst.  No right axillary or supraclavicular adenopathy is noted.  Left breast is normal.  Abdominal:      General: Abdomen is flat. Bowel sounds are normal.      Palpations: Abdomen is soft. There is no mass.   Musculoskeletal:         General: Normal range of motion.   Skin:     General: Skin is warm.   Neurological:      General: No focal deficit present.      Mental Status: She is alert and oriented to person, place, and time.   Psychiatric:         Mood and Affect: Mood normal.         Behavior: Behavior normal.            Labs:  No visits with results within 1 Month(s) from this visit.   Latest known visit with results is:   Admission on 07/14/2022, Discharged on 07/14/2022   Component Date Value Ref Range Status   • Beta-Hcg Urine 07/14/2022 Negative  Negative Final   • Pathology Request 07/14/2022 Sent to Histo   Final       Imaging:           Assessment & Plan:    1.  LCIS of the right breast with ALH status post lumpectomy.  2.  Sebaceous cyst of the right axilla    Plan: I spoke with her about the risks and benefits of chemoprophylaxis with an aromatase inhibitor or tamoxifen.  She had previously been told that the medicine causes arthritis and is very reluctant to undergo any therapy now.  We talked about tamoxifen and its major side effect of hot flashes rather than musculoskeletal symptoms and she is still declining at this time.  We talked that if she had another episode of LCIS she would be at higher risk of recurrence and she was agreeable to come back and see us at that time if such a thing should occur.  In the meantime she will follow-up with Dr. Mcelroy and have every 6-month mammograms.  We also recommended warm soaks for the sebaceous cyst and if it recurs to see Dr. Mcelroy for excision.      Any  questions and concerns raised by the patient were answered to the best of my ability. Thank you for allowing me to participate in the care for this patient. Please feel free to contact me for any questions or concerns.     Hoang Sow M.D.              No Recipients

## 2023-04-03 ENCOUNTER — OFFICE VISIT (OUTPATIENT)
Dept: URGENT CARE | Facility: PHYSICIAN GROUP | Age: 48
End: 2023-04-03
Payer: COMMERCIAL

## 2023-04-03 VITALS
RESPIRATION RATE: 16 BRPM | OXYGEN SATURATION: 97 % | HEART RATE: 65 BPM | HEIGHT: 60 IN | WEIGHT: 142 LBS | DIASTOLIC BLOOD PRESSURE: 68 MMHG | TEMPERATURE: 98.7 F | SYSTOLIC BLOOD PRESSURE: 114 MMHG | BODY MASS INDEX: 27.88 KG/M2

## 2023-04-03 DIAGNOSIS — Z48.02 ENCOUNTER FOR REMOVAL OF SUTURES: ICD-10-CM

## 2023-04-03 PROCEDURE — 99202 OFFICE O/P NEW SF 15 MIN: CPT | Performed by: STUDENT IN AN ORGANIZED HEALTH CARE EDUCATION/TRAINING PROGRAM

## 2023-04-03 PROCEDURE — 15854 REMOVAL SUTR&STAPL XREQ ANES: CPT | Performed by: STUDENT IN AN ORGANIZED HEALTH CARE EDUCATION/TRAINING PROGRAM

## 2023-04-03 ASSESSMENT — FIBROSIS 4 INDEX: FIB4 SCORE: 0.49

## 2023-04-04 NOTE — PROGRESS NOTES
Subjective     Tracey Pruitt is a 48 y.o. female who presents with Suture / Staple Removal (Suture removal from frontal and occipital regions)            Tracey is a 48 y.o. female who presents to urgent care for suture removal.  Patient states that she had skin biopsies in [place].  Patient states provider [place] told her to have sutures removed in 7 days.  Patient states it has been 8 days since sutures were placed.  Patient has not noticed any redness swelling pain or drainage.  no fever/chills.    Suture / Staple Removal  The sutures were placed 7 to 10 days ago. She tried nothing since the wound repair. There has been no drainage from the wound. There is no redness present. There is no swelling present. There is no pain present.     ROS           Objective     /68 (BP Location: Right arm, Patient Position: Sitting, BP Cuff Size: Adult long)   Pulse 65   Temp 37.1 °C (98.7 °F) (Temporal)   Resp 16   Ht 1.524 m (5')   Wt 64.4 kg (142 lb)   SpO2 97%   BMI 27.73 kg/m²      Physical Exam  Vitals reviewed.   Constitutional:       Appearance: Normal appearance.   HENT:      Head: Normocephalic and atraumatic.        Comments: 2 linear healing lacerations (pictured above) with sutures in place.  No surrounding erythema or swelling.  No active drainage.       Mouth/Throat:      Mouth: Mucous membranes are moist.      Pharynx: Oropharynx is clear.   Eyes:      Extraocular Movements: Extraocular movements intact.      Conjunctiva/sclera: Conjunctivae normal.      Pupils: Pupils are equal, round, and reactive to light.   Cardiovascular:      Rate and Rhythm: Normal rate and regular rhythm.   Pulmonary:      Effort: Pulmonary effort is normal.      Breath sounds: Normal breath sounds.   Musculoskeletal:      Cervical back: Normal range of motion.   Neurological:      Mental Status: She is alert.                           Assessment & Plan        1. Encounter for removal of sutures  - Sutures removed in clinic.   Patient tolerated well.  - No active signs of infection.    Supportive care measures, wound care instructions and indications for immediate follow-up discussed with patient.     My total time spent caring for the patient on the day of the encounter was 20 minutes including obtaining patient history, physical exam, discussing supportive care measures, wound care instructions, appropriate follow-up and indications for immediate follow-up. This does not include time spent on separately billable procedures/tests.     Instructed to return to urgent care or nearest emergency department for any change in condition, further concerns, or new concerning symptoms.    Patient states understanding and agrees with the plan of care and discharge instructions.

## 2023-08-14 ENCOUNTER — OFFICE VISIT (OUTPATIENT)
Dept: URGENT CARE | Facility: PHYSICIAN GROUP | Age: 48
End: 2023-08-14
Payer: COMMERCIAL

## 2023-08-14 VITALS
WEIGHT: 145 LBS | HEART RATE: 60 BPM | HEIGHT: 60 IN | DIASTOLIC BLOOD PRESSURE: 70 MMHG | BODY MASS INDEX: 28.47 KG/M2 | OXYGEN SATURATION: 97 % | RESPIRATION RATE: 16 BRPM | TEMPERATURE: 98.7 F | SYSTOLIC BLOOD PRESSURE: 118 MMHG

## 2023-08-14 DIAGNOSIS — U07.1 COVID-19: ICD-10-CM

## 2023-08-14 PROCEDURE — 3074F SYST BP LT 130 MM HG: CPT | Performed by: STUDENT IN AN ORGANIZED HEALTH CARE EDUCATION/TRAINING PROGRAM

## 2023-08-14 PROCEDURE — 99213 OFFICE O/P EST LOW 20 MIN: CPT | Performed by: STUDENT IN AN ORGANIZED HEALTH CARE EDUCATION/TRAINING PROGRAM

## 2023-08-14 PROCEDURE — 3078F DIAST BP <80 MM HG: CPT | Performed by: STUDENT IN AN ORGANIZED HEALTH CARE EDUCATION/TRAINING PROGRAM

## 2023-08-14 ASSESSMENT — FIBROSIS 4 INDEX: FIB4 SCORE: 0.49

## 2023-08-14 NOTE — LETTER
August 14, 2023    To Whom It May Concern:         This is confirmation that Tracey Pruitt attended her scheduled appointment with Alexis Shipley P.A.-C. on 8/14/23.  Patient may return to work at this time.  I do not believe her to still be contagious with COVID-19 although I would like her to wear a mask for the next 4 days while at work.         If you have any questions please do not hesitate to call me at the phone number listed below.    Sincerely,          Alexis Shipley P.A.-C.  [phone]

## 2023-08-15 NOTE — PROGRESS NOTES
Subjective:   Tracey Pruitt is a 48 y.o. female who presents for Other (Pt states she needs a doctors note to be able to return to work. Pt states she had covid last week.)      HPI:  Pleasant 48-year-old female presents the urgent care for follow-up visit for COVID-19 and a note allowing her to go back to work.  Symptoms started 6 days ago.  She initially had a fever, sore throat, nasal congestion, and fatigue.  Dry cough also at the start of her symptoms.  Reports that the fever lasted 3 days but has been gone for over 24 hours.  She reports great improvement in her symptoms and is no longer having any symptoms at this time.  No nausea, vomiting, chest pain, palpitations, sputum production, shortness of breath, dizziness, or headache.    Medications:    This patient does not have an active medication from one of the medication groupers.    Allergies: Patient has no known allergies.    Problem List: Tracey Pruitt does not have any pertinent problems on file.    Surgical History:  Past Surgical History:   Procedure Laterality Date    BREAST BIOPSY  2022    Procedure: WIRE DIRECTED RIGHT BIOPSY, BREAST;  Surgeon: Alfredo Mcelroy M.D.;  Location: SURGERY SAME DAY [facility];  Service: General    OTHER ABDOMINAL SURGERY             Past Social Hx: Tracey Pruitt  reports that she quit smoking about 15 years ago. Her smoking use included cigarettes. She has a 10.00 pack-year smoking history. She has never used smokeless tobacco. She reports that she does not drink alcohol and does not use drugs.     Past Family Hx:  Tracey Pruitt family history is not on file.     Problem list, medications, and allergies reviewed by myself today in Epic.     Objective:     /70 (BP Location: Left arm, Patient Position: Sitting, BP Cuff Size: Adult long)   Pulse 60   Temp 37.1 °C (98.7 °F) (Temporal)   Resp 16   Ht 1.524 m (5')   Wt 65.8 kg (145 lb)   SpO2 97%   BMI 28.32 kg/m²     Physical Exam  Vitals  reviewed.   Constitutional:       General: She is not in acute distress.     Appearance: Normal appearance.   HENT:      Head: Normocephalic.      Mouth/Throat:      Mouth: Mucous membranes are moist.   Eyes:      Pupils: Pupils are equal, round, and reactive to light.   Cardiovascular:      Rate and Rhythm: Normal rate and regular rhythm.      Pulses: Normal pulses.      Heart sounds: Normal heart sounds. No murmur heard.  Pulmonary:      Effort: Pulmonary effort is normal. No respiratory distress.      Breath sounds: Normal breath sounds. No stridor. No wheezing, rhonchi or rales.   Skin:     General: Skin is warm and dry.   Neurological:      Mental Status: She is alert.         Assessment/Plan:     Diagnosis and associated orders:     1. COVID-19           Comments/MDM:     Patient is here on day 6 of symptoms from positive COVID-19.  Pulmonary exam shows no abnormal findings.  No signs of pneumonia.  She has no fever at this time.  Symptoms have fully resolved at this time.  Discussed with patient that since she has past 5 days she may return to work at this time as I have lower suspicion for her being highly contagious.  I do want her to wear a mask until she reaches day 10 of symptoms.  Work note provided.  Vitals all within normal limits.  Patient is well-appearing and nontoxic.  She is agreeable to this.  ED/precautions given.         Differential diagnosis, natural history, supportive care, and indications for immediate follow-up discussed.    Advised the patient to follow-up with the primary care physician for recheck, reevaluation, and consideration of further management.    Please note that this dictation was created using voice recognition software. I have made a reasonable attempt to correct obvious errors, but I expect that there are errors of grammar and possibly content that I did not discover before finalizing the note.    Electronically signed by Alexis Shipley PA-C.

## 2024-12-11 DIAGNOSIS — N60.91 ATYPICAL LOBULAR HYPERPLASIA (ALH) OF RIGHT BREAST: ICD-10-CM

## 2024-12-11 DIAGNOSIS — D05.01 NEOPLASM OF RIGHT BREAST, PRIMARY TUMOR STAGING CATEGORY TIS: LOBULAR CARCINOMA IN SITU (LCIS): ICD-10-CM

## 2024-12-11 NOTE — PROGRESS NOTES
I spoke with pt and she informed me she does have a primary at [facility] although currently it is not listed in epic. I reviewed the role of high risk breast clinic with patient given her history of  ALH and LCIS.She would like a referral to our office thus referral sent. Patient informed once insurance approved our office will plan to call to schedule an appointment with the high risk breast clinic.

## 2024-12-17 ENCOUNTER — TELEPHONE (OUTPATIENT)
Dept: SURGERY | Facility: MEDICAL CENTER | Age: 49
End: 2024-12-17
Payer: COMMERCIAL

## 2024-12-17 ENCOUNTER — HOSPITAL ENCOUNTER (OUTPATIENT)
Dept: RADIOLOGY | Facility: MEDICAL CENTER | Age: 49
End: 2024-12-17
Attending: SURGERY
Payer: COMMERCIAL

## 2024-12-17 ENCOUNTER — HOSPITAL ENCOUNTER (OUTPATIENT)
Dept: RADIOLOGY | Facility: MEDICAL CENTER | Age: 49
End: 2024-12-17
Attending: PHYSICIAN ASSISTANT
Payer: COMMERCIAL

## 2024-12-17 NOTE — TELEPHONE ENCOUNTER
I left a voicemail on 12/13 for the patient to call the office to schedule a HR appointment with Brendan

## 2024-12-24 ENCOUNTER — TELEPHONE (OUTPATIENT)
Dept: SURGERY | Facility: MEDICAL CENTER | Age: 49
End: 2024-12-24
Payer: COMMERCIAL

## 2025-01-09 ENCOUNTER — TELEPHONE (OUTPATIENT)
Dept: SURGERY | Facility: MEDICAL CENTER | Age: 50
End: 2025-01-09
Payer: COMMERCIAL

## 2025-01-09 NOTE — TELEPHONE ENCOUNTER
Spoke with Tracey and told her that we received a referral for her to be seen in our high risk breast clinic. Pt informed she would like to see her PCP prior to an appt and will call back when she is ready to schedule.    Kj

## (undated) DEVICE — TUBE CONNECTING SUCTION - CLEAR PLASTIC STERILE 72 IN (50EA/CA)

## (undated) DEVICE — WATER IRRIGATION STERILE 1000ML (12EA/CA)

## (undated) DEVICE — CANISTER SUCTION 3000ML MECHANICAL FILTER AUTO SHUTOFF MEDI-VAC NONSTERILE LF DISP  (40EA/CA)

## (undated) DEVICE — DRAPE LAPAROTOMY T SHEET - (12EA/CA)

## (undated) DEVICE — ELECTRODE DUAL RETURN W/ CORD - (50/PK)

## (undated) DEVICE — SUCTION INSTRUMENT YANKAUER BULBOUS TIP W/O VENT (50EA/CA)

## (undated) DEVICE — MANIFOLD NEPTUNE 1 PORT (20/PK)

## (undated) DEVICE — CANISTER SUCTION RIGID RED 1500CC (40EA/CA)

## (undated) DEVICE — NEEDLE NON SAFETY 25 GA X 1 1/2 IN HYPO (100EA/BX)

## (undated) DEVICE — PROTECTOR ULNA NERVE - (36PR/CA)

## (undated) DEVICE — KIT ANESTHESIA W/CIRCUIT & 3/LT BAG W/FILTER (20EA/CA)

## (undated) DEVICE — KIT  I.V. START (100EA/CA)

## (undated) DEVICE — DRESSING TRANSPARENT FILM TEGADERM 2.375 X 2.75"  (100EA/BX)"

## (undated) DEVICE — TOWEL STOP TIMEOUT SAFETY FLAG (40EA/CA)

## (undated) DEVICE — TUBING CLEARLINK DUO-VENT - C-FLO (48EA/CA)

## (undated) DEVICE — SUTURE 4-0 MONOCRYL PLUS PS-2 - 27 INCH (36/BX)

## (undated) DEVICE — SODIUM CHL IRRIGATION 0.9% 1000ML (12EA/CA)

## (undated) DEVICE — SLEEVE VASO CALF MED - (10PR/CA)

## (undated) DEVICE — SUTURE 3-0 VICRYL PLUS SH - 8X 18 INCH (12/BX)

## (undated) DEVICE — CHLORAPREP 26 ML APPLICATOR - ORANGE TINT(25/CA)

## (undated) DEVICE — SUTURE GENERAL

## (undated) DEVICE — BANDAGE ELASTIC STERILE MATRIX 6 X 10 (20EA/CA)

## (undated) DEVICE — SYRINGE 30 ML LS (56/BX)

## (undated) DEVICE — GOWN WARMING STANDARD FLEX - (30/CA)

## (undated) DEVICE — DRAPE LARGE 3 QUARTER - (20/CA)

## (undated) DEVICE — DRAPE IOBAN II INCISE 23X17 - (10EA/BX 4BX/CA)

## (undated) DEVICE — SET LEADWIRE 5 LEAD BEDSIDE DISPOSABLE ECG (1SET OF 5/EA)

## (undated) DEVICE — SENSOR OXIMETER ADULT SPO2 RD SET (20EA/BX)

## (undated) DEVICE — CATHETER IV 20 GA X 1-1/4 ---SURG.& SDS ONLY--- (50EA/BX)

## (undated) DEVICE — ELECTRODE 850 FOAM ADHESIVE - HYDROGEL RADIOTRNSPRNT (50/PK)

## (undated) DEVICE — PACK MINOR BASIN - (2EA/CA)

## (undated) DEVICE — GLOVE BIOGEL INDICATOR SZ 7.5 SURGICAL PF LTX - (50PR/BX 4BX/CA)

## (undated) DEVICE — SPONGE GAUZESTER. 2X2 4-PL - (2/PK 50PK/BX 30BX/CS)

## (undated) DEVICE — LACTATED RINGERS INJ 1000 ML - (14EA/CA 60CA/PF)

## (undated) DEVICE — BOVIE BLADE COATED &INSULATED - 25/PK

## (undated) DEVICE — MASK ANESTHESIA ADULT  - (100/CA)

## (undated) DEVICE — CLOSURE SKIN STRIP 1/2 X 4 IN - (STERI STRIP) (50/BX 4BX/CA)

## (undated) DEVICE — MASK AIRWAY SIZE 3 UNIQUE SILICON (10/BX)

## (undated) DEVICE — GLOVE BIOGEL SZ 7 SURGICAL PF LTX - (50PR/BX 4BX/CA)